# Patient Record
Sex: MALE | Race: WHITE | NOT HISPANIC OR LATINO | Employment: FULL TIME | URBAN - METROPOLITAN AREA
[De-identification: names, ages, dates, MRNs, and addresses within clinical notes are randomized per-mention and may not be internally consistent; named-entity substitution may affect disease eponyms.]

---

## 2019-05-16 ENCOUNTER — MEDICAL CORRESPONDENCE (OUTPATIENT)
Dept: HEALTH INFORMATION MANAGEMENT | Facility: CLINIC | Age: 24
End: 2019-05-16

## 2019-06-17 ENCOUNTER — TRANSFERRED RECORDS (OUTPATIENT)
Dept: HEALTH INFORMATION MANAGEMENT | Facility: CLINIC | Age: 24
End: 2019-06-17

## 2019-06-17 ENCOUNTER — MEDICAL CORRESPONDENCE (OUTPATIENT)
Dept: HEALTH INFORMATION MANAGEMENT | Facility: CLINIC | Age: 24
End: 2019-06-17

## 2019-06-24 DIAGNOSIS — R76.8 HEPATITIS C ANTIBODY TEST POSITIVE: Primary | ICD-10-CM

## 2019-06-27 ENCOUNTER — TELEPHONE (OUTPATIENT)
Dept: GASTROENTEROLOGY | Facility: CLINIC | Age: 24
End: 2019-06-27

## 2019-06-27 NOTE — TELEPHONE ENCOUNTER
Patient contacted and reminded of upcoming appointment.  Patient confirmed they will be attending.  Patient instructed to bring updated medications list to appointment.    Chiquita Nichols CMA  6/27/2019 1:32 PM

## 2019-06-27 NOTE — TELEPHONE ENCOUNTER
RECORDS RECEIVED FROM: Hepatitis C- referred by [facility]- appt per pt   DATE RECEIVED: 07.01.2019   NOTES STATUS DETAILS   OFFICE NOTE from referring provider Received 06.17.2019 [facility]   OFFICE NOTES from other specialists N/A    DISCHARGE SUMMARY from hospital N/A    MEDICATION LIST N/A    LIVER BIOSPY (IF APPLICABLE)      PATHOLOGY REPORTS  N/A    IMAGING     ENDOSCOPY (IF AVAILABLE) N/A    COLONOSCOPY (IF AVAILABLE) N/A    ULTRASOUND LIVER Care Everywhere 08.10.2018   CT OF ABDOMEN N/A    MRI OF LIVER N/A    FIBROSCAN, US ELASTOGRAPHY, FIBROSIS SCAN, MR ELASTOGRAPHY N/A    LABS     HEPATIC PANEL (LIVER PANEL) In process    BASIC METABOLIC PANEL In process    COMPLETE METABOLIC PANEL In process    COMPLETE BLOOD COUNT (CBC) In process    INTERNATIONAL NORMALIZED RATIO (INR) In process    HEPATITIS C ANTIBODY N/A    HEPATITIS C VIRAL LOAD/PCR N/A    HEPATITIS C GENOTYPE N/A    HEPATITIS B SURFACE ANTIGEN In process    HEPATITIS B SURFACE ANTIBODY In process    HEPATITIS B DNA QUANT LEVEL N/A    HEPATITIS B CORE ANTIBODY In process      06.27.2019 Called [employer] at [phone]* opt 0 left voice message to have image US Abdomen Limited pushed derek.    06.28.2019 Images received called to have them resolved to chart.

## 2019-07-01 ENCOUNTER — OFFICE VISIT (OUTPATIENT)
Dept: GASTROENTEROLOGY | Facility: CLINIC | Age: 24
End: 2019-07-01
Attending: PHYSICIAN ASSISTANT
Payer: COMMERCIAL

## 2019-07-01 ENCOUNTER — PRE VISIT (OUTPATIENT)
Dept: GASTROENTEROLOGY | Facility: CLINIC | Age: 24
End: 2019-07-01

## 2019-07-01 VITALS
WEIGHT: 162.4 LBS | SYSTOLIC BLOOD PRESSURE: 123 MMHG | HEART RATE: 69 BPM | OXYGEN SATURATION: 99 % | DIASTOLIC BLOOD PRESSURE: 69 MMHG

## 2019-07-01 DIAGNOSIS — B18.2 CHRONIC HEPATITIS C WITHOUT HEPATIC COMA (H): Primary | ICD-10-CM

## 2019-07-01 DIAGNOSIS — R76.8 HEPATITIS C ANTIBODY TEST POSITIVE: ICD-10-CM

## 2019-07-01 LAB
ALBUMIN SERPL-MCNC: 4.1 G/DL (ref 3.4–5)
ALP SERPL-CCNC: 102 U/L (ref 40–150)
ALT SERPL W P-5'-P-CCNC: 124 U/L (ref 0–70)
ANION GAP SERPL CALCULATED.3IONS-SCNC: 5 MMOL/L (ref 3–14)
AST SERPL W P-5'-P-CCNC: 50 U/L (ref 0–45)
BILIRUB DIRECT SERPL-MCNC: <0.1 MG/DL (ref 0–0.2)
BILIRUB SERPL-MCNC: 0.4 MG/DL (ref 0.2–1.3)
BUN SERPL-MCNC: 16 MG/DL (ref 7–30)
CALCIUM SERPL-MCNC: 8.9 MG/DL (ref 8.5–10.1)
CHLORIDE SERPL-SCNC: 104 MMOL/L (ref 94–109)
CO2 SERPL-SCNC: 30 MMOL/L (ref 20–32)
CREAT SERPL-MCNC: 0.65 MG/DL (ref 0.66–1.25)
ERYTHROCYTE [DISTWIDTH] IN BLOOD BY AUTOMATED COUNT: 12.1 % (ref 10–15)
GFR SERPL CREATININE-BSD FRML MDRD: >90 ML/MIN/{1.73_M2}
GLUCOSE SERPL-MCNC: 55 MG/DL (ref 70–99)
HBV CORE AB SERPL QL IA: NONREACTIVE
HBV SURFACE AB SERPL IA-ACNC: 28.29 M[IU]/ML
HBV SURFACE AG SERPL QL IA: NONREACTIVE
HCT VFR BLD AUTO: 40.9 % (ref 40–53)
HGB BLD-MCNC: 13.3 G/DL (ref 13.3–17.7)
INR PPP: 1.07 (ref 0.86–1.14)
MCH RBC QN AUTO: 29.7 PG (ref 26.5–33)
MCHC RBC AUTO-ENTMCNC: 32.5 G/DL (ref 31.5–36.5)
MCV RBC AUTO: 91 FL (ref 78–100)
PLATELET # BLD AUTO: 210 10E9/L (ref 150–450)
POTASSIUM SERPL-SCNC: 3.1 MMOL/L (ref 3.4–5.3)
PROT SERPL-MCNC: 7.7 G/DL (ref 6.8–8.8)
RBC # BLD AUTO: 4.48 10E12/L (ref 4.4–5.9)
SODIUM SERPL-SCNC: 138 MMOL/L (ref 133–144)
WBC # BLD AUTO: 6.3 10E9/L (ref 4–11)

## 2019-07-01 PROCEDURE — 85027 COMPLETE CBC AUTOMATED: CPT | Performed by: PHYSICIAN ASSISTANT

## 2019-07-01 PROCEDURE — G0463 HOSPITAL OUTPT CLINIC VISIT: HCPCS | Mod: 25

## 2019-07-01 PROCEDURE — 80048 BASIC METABOLIC PNL TOTAL CA: CPT | Performed by: PHYSICIAN ASSISTANT

## 2019-07-01 PROCEDURE — 87340 HEPATITIS B SURFACE AG IA: CPT | Performed by: PHYSICIAN ASSISTANT

## 2019-07-01 PROCEDURE — 86706 HEP B SURFACE ANTIBODY: CPT | Performed by: PHYSICIAN ASSISTANT

## 2019-07-01 PROCEDURE — 87522 HEPATITIS C REVRS TRNSCRPJ: CPT | Performed by: PHYSICIAN ASSISTANT

## 2019-07-01 PROCEDURE — 86704 HEP B CORE ANTIBODY TOTAL: CPT | Performed by: PHYSICIAN ASSISTANT

## 2019-07-01 PROCEDURE — 36415 COLL VENOUS BLD VENIPUNCTURE: CPT | Performed by: PHYSICIAN ASSISTANT

## 2019-07-01 PROCEDURE — 80076 HEPATIC FUNCTION PANEL: CPT | Performed by: PHYSICIAN ASSISTANT

## 2019-07-01 PROCEDURE — 91200 LIVER ELASTOGRAPHY: CPT | Mod: ZF

## 2019-07-01 PROCEDURE — 85610 PROTHROMBIN TIME: CPT | Performed by: PHYSICIAN ASSISTANT

## 2019-07-01 PROCEDURE — 87902 NFCT AGT GNTYP ALYS HEP C: CPT | Performed by: PHYSICIAN ASSISTANT

## 2019-07-01 RX ORDER — BUPRENORPHINE AND NALOXONE 8; 2 MG/1; MG/1
FILM, SOLUBLE BUCCAL; SUBLINGUAL
Refills: 0 | COMMUNITY
Start: 2019-03-18

## 2019-07-01 ASSESSMENT — PAIN SCALES - GENERAL: PAINLEVEL: NO PAIN (0)

## 2019-07-01 NOTE — LETTER
7/1/2019      RE: Jose David Metzger  [address]  [facility] [address]       Hepatology Clinic Note  Jose David Metzger   Date of Birth 1995  Date of Service 7/1/2019    REASON FOR CONSULTATION: Hepatitis C  REFERRING PROVIDER: Solo Ortiz MD          Assessment/plan:   Jose David Metzger is a 24 year old male with Hepatitis C, genotype 1a, treatment naive. Fibrosis scan today showing F1, 6.6 kilopascals. Currently there are no biochemical or physical signs of cirrhosis. We discussed the natural course of Hepatitis C virus and the benefits of treating the disease. We discussed the treatment regimen and medication side effects. Patient would be a good candidate for Hepatitis C treatment to prevent worsening fibrosis and to prevent extrahepatic manifestations of the disease.     - Will plan to send prescription for Hepatitis C: Harvoni x 8 weeks, Mavyret x 8 weeks, Epclusa x 12 weeks, Zepatier x 12 weeks   - Repeat HCV RNA at 12-weeks after finishing treatment to determine SVR  - Avoid alcohol   - If patient achieves SVR and Fibrosis Stage 2 or less, he does not need regular follow-up in Hepatology Clinic.   - Follow-up in Hepatology Clinic as needed    Mitra Sales PA-C   [facility] Hepatology    -----------------------------------------------------       HPI:   Jose David Metzger is a 24 year old male  presenting for evaluation and treatment of Hepatitis C.     Hepatitis C   -Genotype 1a   -Diagnosed: August 2018  -History: Hx of IVDU  -Prior biopsy: No   -Prior treatments: Naive    Patient states that he was diagnosed with Hepatitis C in August 2018.  He states he likely acquired the virus through sharing needles with another hep C positive drug user.  He last used any IV drugs in July 2018.  He has gone through numerous chemical dependency treatments.  He is currently going to a Suboxone clinic.  He states he lost about 20 pounds over the past 6 months as he changed to a vegetarian diet.  His appetite is  otherwise normal.  He does describe some intermittent sharp stabbing pains that may last up to 30 minutes and his right upper quadrant/epigastric area.  He states that the pain goes away after he has eaten.  He otherwise has normal bowel movements.    Patient denies jaundice, lower extremity edema, abdominal distension or confusion.  Patient also denies melena, hematochezia or hematemesis. Patient denies  fevers, sweats or chills.    PMH: History of IVDU, History of polysubstance abuse     SMH: Cyst removal from ear    Medications: Suboxone    He states he currently in the process of quitting smoking, he is down to once a day cigarettes.  He admits to history of significant alcohol use for at least 10 years, rare alcohol use now.  History of IV drug use as noted above.  He states he drinks heavily and used drugs for about 10 years.  He has had longer periods of sobriety.  He currently works at [employer].  He lives with his fiancée and 5-month old son.    Lab work-up thus far  - Hep B labs pendings  - HIV pending   - HCV RNA 81001     Medical hx Surgical hx   Past Medical History:   Diagnosis Date     Alcohol use disorder, severe, dependence (H)      Amphetamine use disorder, severe (H)      Cannabis use disorder, severe, dependence (H)      Substance abuse (H)     Past Surgical History:   Procedure Laterality Date     COSMETIC SURGERY      ears     ENT SURGERY      bilat ear surgery to remove cysts as a child                 Medications:     Current Outpatient Medications   Medication     buprenorphine HCl-naloxone HCl (SUBOXONE) 8-2 MG per film     NO ACTIVE MEDICATIONS     No current facility-administered medications for this visit.             Allergies:   No Known Allergies         Social History:     Social History     Socioeconomic History     Marital status: Single     Spouse name: Not on file     Number of children: Not on file     Years of education: Not on file     Highest education level: Not on file  "  Occupational History     Not on file   Social Needs     Financial resource strain: Not on file     Food insecurity:     Worry: Not on file     Inability: Not on file     Transportation needs:     Medical: Not on file     Non-medical: Not on file   Tobacco Use     Smoking status: Former Smoker     Packs/day: 1.00     Smokeless tobacco: Never Used   Substance and Sexual Activity     Alcohol use: Yes     Alcohol/week: 0.0 oz     Drug use: Yes     Types: Marijuana     Comment: \"pretty much anything\"     Sexual activity: Yes   Lifestyle     Physical activity:     Days per week: Not on file     Minutes per session: Not on file     Stress: Not on file   Relationships     Social connections:     Talks on phone: Not on file     Gets together: Not on file     Attends Advent service: Not on file     Active member of club or organization: Not on file     Attends meetings of clubs or organizations: Not on file     Relationship status: Not on file     Intimate partner violence:     Fear of current or ex partner: Not on file     Emotionally abused: Not on file     Physically abused: Not on file     Forced sexual activity: Not on file   Other Topics Concern     Parent/sibling w/ CABG, MI or angioplasty before 65F 55M? Not Asked   Social History Narrative     Not on file            Family History:     Family History   Problem Relation Age of Onset     Mental Illness Mother      Substance Abuse Mother      Substance Abuse Father      Unknown/Adopted No family hx of      Depression No family hx of      Anxiety Disorder No family hx of      Schizophrenia No family hx of      Bipolar Disorder No family hx of      Suicide No family hx of      Dementia No family hx of      Baxter Disease No family hx of      Parkinsonism No family hx of      Autism Spectrum Disorder No family hx of      Intellectual Disability (Mental Retardation) No family hx of             Review of Systems:   GEN: See HPI  HEENT: No change in vision or " hearing, mouth sores, dysphagia, lymph nodes  Resp: No shortness of breath, coughing, hx of asthma  CV: No chest pain, palpitations, syncope   GI: See HPI  : No dysuria, history of stones, urine color    Skin: No rash; no pruritus or psoriasis  MS: No arthralgias, myalgias, joint swelling  Neuro: No memory changes, confusion, numbness    Heme: No difficulty clotting, bruising, bleeding  Psych:  No anxiety, depression, agitation          Physical Exam:   VS:  /69   Pulse 69   Wt 73.7 kg (162 lb 6.4 oz)   SpO2 99%       Gen: A&Ox3, NAD, thin  HEENT: non-icteric   CV: RRR, no overt murmurs  Lung: CTA Bilatererally, no wheezing or crackles.   Lym- no palpable lymphadenopathy  Abd: soft, NT, ND, no palpable splenomegaly, liver is not palpable.   Ext: no edema, intact pulses.   Skin: No rash, no palmar erythema, telangiectasias or jaundice  Neuro: grossly intact, no asterixis   Psych: appropriate mood, hyper         Data:   Reviewed in person and significant for:    Lab Results   Component Value Date     07/01/2019      Lab Results   Component Value Date    POTASSIUM 3.1 07/01/2019     Lab Results   Component Value Date    CHLORIDE 104 07/01/2019     Lab Results   Component Value Date    CO2 30 07/01/2019     Lab Results   Component Value Date    BUN 16 07/01/2019     Lab Results   Component Value Date    CR 0.65 07/01/2019       Lab Results   Component Value Date    WBC 6.3 07/01/2019     Lab Results   Component Value Date    HGB 13.3 07/01/2019     Lab Results   Component Value Date    HCT 40.9 07/01/2019     Lab Results   Component Value Date    MCV 91 07/01/2019     Lab Results   Component Value Date     07/01/2019       Lab Results   Component Value Date    AST 50 07/01/2019     Lab Results   Component Value Date     07/01/2019     No results found for: BILICONJ   Lab Results   Component Value Date    BILITOTAL 0.4 07/01/2019       Lab Results   Component Value Date    ALBUMIN 4.1  07/01/2019     Lab Results   Component Value Date    PROTTOTAL 7.7 07/01/2019      Lab Results   Component Value Date    ALKPHOS 102 07/01/2019       Lab Results   Component Value Date    INR 1.07 07/01/2019       Mitra Sales PA-C

## 2019-07-01 NOTE — LETTER
July 2, 2019       TO: Jose David Metzger  [address] [facility] [address]       Dear Mr. Metzger,    We are writing to inform you of your test results.    You are immune to Hepatitis B through vaccination.     You will hear from [facility] within the month.     It was a pleasure to see you at your recent visit. Please let me know if you have any questions or concerns.     Clinic Staff - [phone] option [phone]     Sincerely,     NUVIA Carney  [address] [place] [address], Mail [id number]  [place]  [address].

## 2019-07-01 NOTE — PROGRESS NOTES
Hepatology Clinic Note  Jose David Metzger   Date of Birth 1995  Date of Service 7/1/2019    REASON FOR CONSULTATION: Hepatitis C  REFERRING PROVIDER: Solo Ortiz MD          Assessment/plan:   Jose David Metzger is a 24 year old male with Hepatitis C, genotype 1a, treatment naive. Fibrosis scan today showing F1, 6.6 kilopascals. Currently there are no biochemical or physical signs of cirrhosis. We discussed the natural course of Hepatitis C virus and the benefits of treating the disease. We discussed the treatment regimen and medication side effects. Patient would be a good candidate for Hepatitis C treatment to prevent worsening fibrosis and to prevent extrahepatic manifestations of the disease.     - Will plan to send prescription for Hepatitis C: Harvoni x 8 weeks, Mavyret x 8 weeks, Epclusa x 12 weeks, Zepatier x 12 weeks   - Repeat HCV RNA at 12-weeks after finishing treatment to determine SVR  - Avoid alcohol   - If patient achieves SVR and Fibrosis Stage 2 or less, he does not need regular follow-up in Hepatology Clinic.   - Follow-up in Hepatology Clinic as needed    Mitra Sales PA-C   [facility] Hepatology    -----------------------------------------------------       HPI:   Jose David Metzger is a 24 year old male  presenting for evaluation and treatment of Hepatitis C.     Hepatitis C   -Genotype 1a   -Diagnosed: August 2018  -History: Hx of IVDU  -Prior biopsy: No   -Prior treatments: Naive    Patient states that he was diagnosed with Hepatitis C in August 2018.  He states he likely acquired the virus through sharing needles with another hep C positive drug user.  He last used any IV drugs in July 2018.  He has gone through numerous chemical dependency treatments.  He is currently going to a Suboxone clinic.  He states he lost about 20 pounds over the past 6 months as he changed to a vegetarian diet.  His appetite is otherwise normal.  He does describe some intermittent sharp stabbing pains that  may last up to 30 minutes and his right upper quadrant/epigastric area.  He states that the pain goes away after he has eaten.  He otherwise has normal bowel movements.    Patient denies jaundice, lower extremity edema, abdominal distension or confusion.  Patient also denies melena, hematochezia or hematemesis. Patient denies  fevers, sweats or chills.    PMH: History of IVDU, History of polysubstance abuse     SMH: Cyst removal from ear    Medications: Suboxone    He states he currently in the process of quitting smoking, he is down to once a day cigarettes.  He admits to history of significant alcohol use for at least 10 years, rare alcohol use now.  History of IV drug use as noted above.  He states he drinks heavily and used drugs for about 10 years.  He has had longer periods of sobriety.  He currently works at [employer].  He lives with his fiancée and 5-month old son.    Lab work-up thus far  - Hep B labs pendings  - HIV pending   - HCV RNA 11048     Medical hx Surgical hx   Past Medical History:   Diagnosis Date     Alcohol use disorder, severe, dependence (H)      Amphetamine use disorder, severe (H)      Cannabis use disorder, severe, dependence (H)      Substance abuse (H)     Past Surgical History:   Procedure Laterality Date     COSMETIC SURGERY      ears     ENT SURGERY      bilat ear surgery to remove cysts as a child                 Medications:     Current Outpatient Medications   Medication     buprenorphine HCl-naloxone HCl (SUBOXONE) 8-2 MG per film     NO ACTIVE MEDICATIONS     No current facility-administered medications for this visit.             Allergies:   No Known Allergies         Social History:     Social History     Socioeconomic History     Marital status: Single     Spouse name: Not on file     Number of children: Not on file     Years of education: Not on file     Highest education level: Not on file   Occupational History     Not on file   Social Needs     Financial resource  "strain: Not on file     Food insecurity:     Worry: Not on file     Inability: Not on file     Transportation needs:     Medical: Not on file     Non-medical: Not on file   Tobacco Use     Smoking status: Former Smoker     Packs/day: 1.00     Smokeless tobacco: Never Used   Substance and Sexual Activity     Alcohol use: Yes     Alcohol/week: 0.0 oz     Drug use: Yes     Types: Marijuana     Comment: \"pretty much anything\"     Sexual activity: Yes   Lifestyle     Physical activity:     Days per week: Not on file     Minutes per session: Not on file     Stress: Not on file   Relationships     Social connections:     Talks on phone: Not on file     Gets together: Not on file     Attends Scientology service: Not on file     Active member of club or organization: Not on file     Attends meetings of clubs or organizations: Not on file     Relationship status: Not on file     Intimate partner violence:     Fear of current or ex partner: Not on file     Emotionally abused: Not on file     Physically abused: Not on file     Forced sexual activity: Not on file   Other Topics Concern     Parent/sibling w/ CABG, MI or angioplasty before 65F 55M? Not Asked   Social History Narrative     Not on file            Family History:     Family History   Problem Relation Age of Onset     Mental Illness Mother      Substance Abuse Mother      Substance Abuse Father      Unknown/Adopted No family hx of      Depression No family hx of      Anxiety Disorder No family hx of      Schizophrenia No family hx of      Bipolar Disorder No family hx of      Suicide No family hx of      Dementia No family hx of      Columbia Disease No family hx of      Parkinsonism No family hx of      Autism Spectrum Disorder No family hx of      Intellectual Disability (Mental Retardation) No family hx of             Review of Systems:   GEN: See HPI  HEENT: No change in vision or hearing, mouth sores, dysphagia, lymph nodes  Resp: No shortness of breath, " coughing, hx of asthma  CV: No chest pain, palpitations, syncope   GI: See HPI  : No dysuria, history of stones, urine color    Skin: No rash; no pruritus or psoriasis  MS: No arthralgias, myalgias, joint swelling  Neuro: No memory changes, confusion, numbness    Heme: No difficulty clotting, bruising, bleeding  Psych:  No anxiety, depression, agitation          Physical Exam:   VS:  /69   Pulse 69   Wt 73.7 kg (162 lb 6.4 oz)   SpO2 99%       Gen: A&Ox3, NAD, thin  HEENT: non-icteric   CV: RRR, no overt murmurs  Lung: CTA Bilatererally, no wheezing or crackles.   Lym- no palpable lymphadenopathy  Abd: soft, NT, ND, no palpable splenomegaly, liver is not palpable.   Ext: no edema, intact pulses.   Skin: No rash, no palmar erythema, telangiectasias or jaundice  Neuro: grossly intact, no asterixis   Psych: appropriate mood, hyper         Data:   Reviewed in person and significant for:    Lab Results   Component Value Date     07/01/2019      Lab Results   Component Value Date    POTASSIUM 3.1 07/01/2019     Lab Results   Component Value Date    CHLORIDE 104 07/01/2019     Lab Results   Component Value Date    CO2 30 07/01/2019     Lab Results   Component Value Date    BUN 16 07/01/2019     Lab Results   Component Value Date    CR 0.65 07/01/2019       Lab Results   Component Value Date    WBC 6.3 07/01/2019     Lab Results   Component Value Date    HGB 13.3 07/01/2019     Lab Results   Component Value Date    HCT 40.9 07/01/2019     Lab Results   Component Value Date    MCV 91 07/01/2019     Lab Results   Component Value Date     07/01/2019       Lab Results   Component Value Date    AST 50 07/01/2019     Lab Results   Component Value Date     07/01/2019     No results found for: BILICONJ   Lab Results   Component Value Date    BILITOTAL 0.4 07/01/2019       Lab Results   Component Value Date    ALBUMIN 4.1 07/01/2019     Lab Results   Component Value Date    PROTTOTAL 7.7 07/01/2019       Lab Results   Component Value Date    ALKPHOS 102 07/01/2019       Lab Results   Component Value Date    INR 1.07 07/01/2019

## 2019-07-01 NOTE — NURSING NOTE
Chief Complaint   Patient presents with     Consult     hep c     Vital signs:      BP: 123/69 Pulse: 69     SpO2: 99 %       Weight: 73.7 kg (162 lb 6.4 oz)  There is no height or weight on file to calculate BMI.      Rosalia Donald

## 2019-07-02 RX ORDER — LEDIPASVIR AND SOFOSBUVIR 90; 400 MG/1; MG/1
1 TABLET, FILM COATED ORAL DAILY
Qty: 28 TABLET | Refills: 1 | Status: SHIPPED | OUTPATIENT
Start: 2019-07-02

## 2019-07-04 LAB
HCV GENTYP SERPL NAA+PROBE: NORMAL
HCV RNA SERPL NAA+PROBE-ACNC: ABNORMAL [IU]/ML
HCV RNA SERPL NAA+PROBE-LOG IU: 5.1 LOG IU/ML

## 2019-07-05 ENCOUNTER — TELEPHONE (OUTPATIENT)
Dept: GASTROENTEROLOGY | Facility: CLINIC | Age: 24
End: 2019-07-05

## 2019-07-05 NOTE — TELEPHONE ENCOUNTER
PA Initiation    Medication: Harvoni  Insurance Company: [employer] [place] - Phone [phone] Fax [phone]  Pharmacy Filling the Rx: [facility] [place], [place] - [address] [place] [address]  Filling Pharmacy Phone: [phone]  Filling Pharmacy Fax:    Start Date: 7/5/2019

## 2019-07-08 NOTE — TELEPHONE ENCOUNTER
Prior Authorization Approval    Authorization Effective Date: 7/2/2019  Authorization Expiration Date: 9/24/2019  Medication: Harvoni  Approved Dose/Quantity: 8 WEEKS   Reference #: [id number]   Insurance Company: [employer] [place] - Phone [phone] Fax [phone]  Expected CoPay:     UNKNOWN  Which Pharmacy is filling the prescription (Not needed for infusion/clinic administered): [facility] [place] - [address] [place] [address]  Pharmacy Notified: Yes  Patient Notified: Yes

## 2021-05-24 ENCOUNTER — RECORDS - HEALTHEAST (OUTPATIENT)
Dept: ADMINISTRATIVE | Facility: CLINIC | Age: 26
End: 2021-05-24

## 2021-10-14 ENCOUNTER — LAB REQUISITION (OUTPATIENT)
Dept: LAB | Facility: CLINIC | Age: 26
End: 2021-10-14
Payer: COMMERCIAL

## 2021-10-14 DIAGNOSIS — J02.9 ACUTE PHARYNGITIS, UNSPECIFIED: ICD-10-CM

## 2021-10-14 PROCEDURE — 87081 CULTURE SCREEN ONLY: CPT | Mod: ORL | Performed by: FAMILY MEDICINE

## 2021-10-16 LAB — BACTERIA SPEC CULT: NORMAL

## 2023-05-10 ENCOUNTER — HOSPITAL ENCOUNTER (EMERGENCY)
Facility: CLINIC | Age: 28
Discharge: HOME | End: 2023-05-10
Attending: EMERGENCY MEDICINE | Admitting: EMERGENCY MEDICINE
Payer: COMMERCIAL

## 2023-05-10 VITALS
WEIGHT: 173 LBS | RESPIRATION RATE: 18 BRPM | BODY MASS INDEX: 22.2 KG/M2 | HEIGHT: 74 IN | SYSTOLIC BLOOD PRESSURE: 123 MMHG | OXYGEN SATURATION: 96 % | HEART RATE: 92 BPM | DIASTOLIC BLOOD PRESSURE: 81 MMHG | TEMPERATURE: 98.7 F

## 2023-05-10 DIAGNOSIS — F11.29 OPIOID DEPENDENCE WITH OPIOID-INDUCED DISORDER (H): ICD-10-CM

## 2023-05-10 LAB
AMPHETAMINES UR QL SCN: ABNORMAL
BARBITURATES UR QL SCN: ABNORMAL
BENZODIAZ UR QL SCN: ABNORMAL
BZE UR QL SCN: ABNORMAL
CANNABINOIDS UR QL SCN: ABNORMAL
OPIATES UR QL SCN: ABNORMAL

## 2023-05-10 PROCEDURE — 99283 EMERGENCY DEPT VISIT LOW MDM: CPT | Performed by: EMERGENCY MEDICINE

## 2023-05-10 PROCEDURE — 80307 DRUG TEST PRSMV CHEM ANLYZR: CPT | Performed by: EMERGENCY MEDICINE

## 2023-05-10 RX ORDER — DIPHENHYDRAMINE HCL 25 MG
25 CAPSULE ORAL
COMMUNITY

## 2023-05-10 RX ORDER — METHADONE HYDROCHLORIDE 10 MG/ML
90 CONCENTRATE ORAL
COMMUNITY
Start: 2022-07-08

## 2023-05-10 RX ORDER — GABAPENTIN 300 MG/1
300 CAPSULE ORAL
COMMUNITY
Start: 2022-07-07 | End: 2023-07-07

## 2023-05-10 ASSESSMENT — ACTIVITIES OF DAILY LIVING (ADL): ADLS_ACUITY_SCORE: 35

## 2023-05-10 NOTE — ED TRIAGE NOTES
Patient presents seeking medical evaluation and psych evaluation having a dirty UA at outpatient treatment facility. Patient needs letter that states he is not currently under the influence and he is medically and mentally stable. Patient reports being on methadone for 4 years.      Triage Assessment     Row Name 05/10/23 1419       Triage Assessment (Adult)    Airway WDL WDL       Respiratory WDL    Respiratory WDL WDL       Skin Circulation/Temperature WDL    Skin Circulation/Temperature WDL WDL       Cardiac WDL    Cardiac WDL WDL       Peripheral/Neurovascular WDL    Peripheral Neurovascular WDL WDL       Cognitive/Neuro/Behavioral WDL    Cognitive/Neuro/Behavioral WDL WDL

## 2023-05-10 NOTE — ED PROVIDER NOTES
"ED Provider Note  [facility]      History     Chief Complaint   Patient presents with     Psychiatric Evaluation     Patient presents seeking medical evaluation and psych evaluation having a dirty UA at outpatient treatment facility. Patient needs letter that states he is not currently under the influence and he is medically and mentally stable. Patient reports being on methadone for 4 years.      HPI  Jose David Metzger is a 27 year old male with opiate dependence who presents from his treatment program looking for medical clearance. He says he is at [facility]. He has sober housing through them as well.  He says he used about 3 days ago but not since. They felt he was under the influence today and so had him come here for evaluation. He says he is not withdrawing. He denies si/hi/hallucinations. He would like to return to the program.  He takes methadone 100 mg daily and had his am dose.  He says he does feel anxious and really wants to return to the program.  He wants to maintain sobriety.        Physical Exam   BP: 123/81  Pulse: 92  Temp: 98.7  F (37.1  C)  Resp: 18  Height: 188 cm (6' 2\")  Weight: 78.5 kg (173 lb)  SpO2: 96 %  Physical Exam  Vitals and nursing note reviewed.   HENT:      Head: Normocephalic and atraumatic.      Nose: No congestion or rhinorrhea.   Eyes:      General: No scleral icterus.     Extraocular Movements: Extraocular movements intact.   Cardiovascular:      Rate and Rhythm: Normal rate.   Pulmonary:      Effort: Pulmonary effort is normal.   Musculoskeletal:         General: No signs of injury. Normal range of motion.      Cervical back: Normal range of motion.   Skin:     Coloration: Skin is not jaundiced or pale.   Neurological:      General: No focal deficit present.      Mental Status: He is alert and oriented to person, place, and time.   Psychiatric:         Attention and Perception: Attention and perception normal.         Mood and " Affect: Mood is anxious.         Speech: Speech normal.         Behavior: Behavior normal. Behavior is cooperative.         Thought Content: Thought content normal.         Cognition and Memory: Cognition and memory normal.         Judgment: Judgment normal.           ED Course, Procedures, & Data      Procedures                     Results for orders placed or performed during the hospital encounter of 05/10/23   Drug abuse screen 1 urine (ED)     Status: Abnormal   Result Value Ref Range    Amphetamines Urine Screen Positive (A) Screen Negative    Barbituates Urine Screen Negative Screen Negative    Benzodiazepine Urine Screen Positive (A) Screen Negative    Cannabinoids Urine Screen Positive (A) Screen Negative    Cocaine Urine Screen Negative Screen Negative    Opiates Urine Screen Negative Screen Negative   Urine Drugs of Abuse Screen     Status: Abnormal    Narrative    The following orders were created for panel order Urine Drugs of Abuse Screen.  Procedure                               Abnormality         Status                     ---------                               -----------         ------                     Drug abuse screen 1 urin...[[id number]]  Abnormal            Final result                 Please view results for these tests on the individual orders.     Medications - No data to display  Labs Ordered and Resulted from Time of ED Arrival to Time of ED Departure   DRUG ABUSE SCREEN 1 URINE (ED) - Abnormal       Result Value    Amphetamines Urine Screen Positive (*)     Barbituates Urine Screen Negative      Benzodiazepine Urine Screen Positive (*)     Cannabinoids Urine Screen Positive (*)     Cocaine Urine Screen Negative      Opiates Urine Screen Negative       No orders to display          Critical care was not performed.     Medical Decision Making  The patient's presentation was of low complexity (an acute and uncomplicated illness or injury).    The patient's evaluation involved:  history  and exam without other MDM data elements    The patient's management necessitated only low risk treatment.      Assessment & Plan    Jose David Metzger is a 27 year old male with opiate dependence who presents from his treatment program looking for medical clearance.  He says they felt he was under the influence and worried that he could withdraw.  He says he had a dirty UA and admits to using a few days ago. He is on methadone.  He says he wants to be able to go back to his sober/cd program.  He is medically appropriate to return to their program. He was discharged per his request.     I have reviewed the nursing notes. I have reviewed the findings, diagnosis, plan and need for follow up with the patient.    Discharge Medication List as of 5/10/2023  4:09 PM          Final diagnoses:   Opioid dependence with opioid-induced disorder (H)       Shreya Calero MD  [facility] EMERGENCY DEPARTMENT  5/10/2023     Shreya Caleor MD  05/10/23 1934

## 2023-05-10 NOTE — DISCHARGE INSTRUCTIONS
Return to [place] and continue chemical dependency treatment.     No acute medical concerns found today.